# Patient Record
(demographics unavailable — no encounter records)

---

## 2025-04-18 NOTE — ASSESSMENT
[FreeTextEntry1] : Left middle finger proximal phalanx head, intraarticular fracture, minimally displaced - [id number]

## 2025-04-18 NOTE — HISTORY OF PRESENT ILLNESS
[de-identified] : Age: 14 year M PMHx: none Hand Dominance: RHD Chief Complaint: Left middle finger s/p trauma 04/07/25. Patient reports that he was playing basketball when he jammed it on the basketball, causing his injury. Patient was seen at [facility] on 04/09/25 where he had radiographs performed that showed a fracture in the middle finger. Patient presents in a finger splint and reports being compliant with wearing it. Reports some intermittent numbness/tingling.  Trauma: 04/07/25 Outside Imaging/Treatment: radiographs from [facility] 04/09/25 OTC Medications: none OT/PT: none Bracing: finger in splint Pain worse with: movement Pain better with: rest ***Accompanied by mother****

## 2025-05-09 NOTE — HISTORY OF PRESENT ILLNESS
[de-identified] : Age: 14 year M PMHx: none Hand Dominance: RHD Chief Complaint: Left middle finger s/p trauma 04/07/25. Patient reports that he was playing basketball when he jammed it on the basketball, causing his injury. Patient was seen at [facility] on 04/09/25 where he had radiographs performed that showed a fracture in the middle finger. Patient presents in a finger splint and reports being compliant with wearing it. Reports some intermittent numbness/tingling.  Trauma: 04/07/25 Outside Imaging/Treatment: radiographs from [facility] 04/09/25 OTC Medications: none OT/PT: none Bracing: finger in splint Pain worse with: movement Pain better with: rest ***Accompanied by mother****  05/09/25: f/u left middle finger. Patient reports that he is doing well, reporting no pain or discomfort. Patient reports improvements in his ROM and strength. Patient presents in the finger splint and reports being compliant with it. Patient only reports pain in his finger when he tries to make a fist. Denies numbness/tingling.

## 2025-05-09 NOTE — ASSESSMENT
[FreeTextEntry1] : EXAM Left middle finger with resolved swelling/ecchymosis at PIPj, ski intact. Mild ttp at PIPj. Able to gently flex and extend at MCP, PIP and DIP with no rotational deformity. Sensation intact at radial and ulnar pulp. <2sec cap refill.  Left middle finger radiographs with proximal phalanx head, intraarticular fracture, minimally displaced. +callus, alignment maintained.  (3-view)  ASSESSMENT/PLAN Left middle finger proximal phalanx head, intraarticular fracture, minimally displaced [CPT 25841] - reviewed radiographs and pathoanatomy with patient. Discussed the current alignment both radiographically and clinically are within acceptable parameters to manage nonoperatively. Will manage in coban jay tape, ROM permitted. Elevate, minimize use. OT for ROM  Acute complicated injury - Risk of pain, stiffness, malunion, nonunion, rotational malalignment, post-traumatic arthrosis, and displacement requiring further intervention.  F/u 6week; reassess